# Patient Record
Sex: MALE | Race: WHITE | NOT HISPANIC OR LATINO
[De-identification: names, ages, dates, MRNs, and addresses within clinical notes are randomized per-mention and may not be internally consistent; named-entity substitution may affect disease eponyms.]

---

## 2018-06-13 ENCOUNTER — APPOINTMENT (OUTPATIENT)
Dept: UROLOGY | Facility: CLINIC | Age: 58
End: 2018-06-13
Payer: COMMERCIAL

## 2018-06-13 VITALS
SYSTOLIC BLOOD PRESSURE: 159 MMHG | HEIGHT: 74 IN | BODY MASS INDEX: 33.37 KG/M2 | WEIGHT: 260 LBS | HEART RATE: 90 BPM | DIASTOLIC BLOOD PRESSURE: 86 MMHG

## 2018-06-13 DIAGNOSIS — Z78.9 OTHER SPECIFIED HEALTH STATUS: ICD-10-CM

## 2018-06-13 DIAGNOSIS — E11.9 TYPE 2 DIABETES MELLITUS W/OUT COMPLICATIONS: ICD-10-CM

## 2018-06-13 DIAGNOSIS — B36.9 SUPERFICIAL MYCOSIS, UNSPECIFIED: ICD-10-CM

## 2018-06-13 DIAGNOSIS — Z80.1 FAMILY HISTORY OF MALIGNANT NEOPLASM OF TRACHEA, BRONCHUS AND LUNG: ICD-10-CM

## 2018-06-13 DIAGNOSIS — M10.9 GOUT, UNSPECIFIED: ICD-10-CM

## 2018-06-13 PROCEDURE — 99204 OFFICE O/P NEW MOD 45 MIN: CPT

## 2018-06-13 RX ORDER — FEBUXOSTAT 40 MG/1
40 TABLET ORAL
Qty: 60 | Refills: 0 | Status: ACTIVE | COMMUNITY
Start: 2018-05-01

## 2018-06-13 RX ORDER — RAMIPRIL 5 MG/1
5 CAPSULE ORAL
Qty: 30 | Refills: 0 | Status: ACTIVE | COMMUNITY
Start: 2017-09-22

## 2018-06-13 RX ORDER — PIOGLITAZONE HYDROCHLORIDE 30 MG/1
30 TABLET ORAL
Qty: 60 | Refills: 0 | Status: ACTIVE | COMMUNITY
Start: 2018-05-21

## 2018-06-13 RX ORDER — NAPROXEN 500 MG/1
500 TABLET ORAL
Qty: 60 | Refills: 0 | Status: ACTIVE | COMMUNITY
Start: 2018-03-06

## 2018-06-13 RX ORDER — SIMVASTATIN 20 MG/1
20 TABLET, FILM COATED ORAL
Qty: 30 | Refills: 0 | Status: ACTIVE | COMMUNITY
Start: 2017-10-23

## 2018-07-10 ENCOUNTER — APPOINTMENT (OUTPATIENT)
Dept: UROLOGY | Facility: CLINIC | Age: 58
End: 2018-07-10
Payer: COMMERCIAL

## 2018-07-10 PROCEDURE — 93980 PENILE VASCULAR STUDY: CPT

## 2018-08-21 ENCOUNTER — APPOINTMENT (OUTPATIENT)
Dept: UROLOGY | Facility: CLINIC | Age: 58
End: 2018-08-21
Payer: COMMERCIAL

## 2018-08-21 VITALS
BODY MASS INDEX: 33.37 KG/M2 | SYSTOLIC BLOOD PRESSURE: 136 MMHG | HEIGHT: 74 IN | DIASTOLIC BLOOD PRESSURE: 73 MMHG | HEART RATE: 90 BPM | WEIGHT: 260 LBS

## 2018-08-21 PROCEDURE — 99213 OFFICE O/P EST LOW 20 MIN: CPT

## 2018-08-21 RX ORDER — VARDENAFIL HYDROCHLORIDE 11.85 MG/1
10 TABLET, ORALLY DISINTEGRATING ORAL
Qty: 4 | Refills: 5 | Status: ACTIVE | COMMUNITY
Start: 2018-08-21 | End: 1900-01-01

## 2018-11-06 ENCOUNTER — APPOINTMENT (OUTPATIENT)
Dept: UROLOGY | Facility: CLINIC | Age: 58
End: 2018-11-06
Payer: COMMERCIAL

## 2018-11-06 VITALS
DIASTOLIC BLOOD PRESSURE: 67 MMHG | SYSTOLIC BLOOD PRESSURE: 112 MMHG | BODY MASS INDEX: 33.37 KG/M2 | HEART RATE: 84 BPM | HEIGHT: 74 IN | WEIGHT: 260 LBS

## 2018-11-06 DIAGNOSIS — N52.9 MALE ERECTILE DYSFUNCTION, UNSPECIFIED: ICD-10-CM

## 2018-11-06 PROCEDURE — 99213 OFFICE O/P EST LOW 20 MIN: CPT

## 2018-11-06 RX ORDER — VARDENAFIL 20 MG/1
20 TABLET, FILM COATED ORAL DAILY
Qty: 6 | Refills: 5 | Status: ACTIVE | COMMUNITY
Start: 2018-11-06 | End: 1900-01-01

## 2019-09-21 ENCOUNTER — EMERGENCY (EMERGENCY)
Facility: HOSPITAL | Age: 59
LOS: 0 days | Discharge: HOME | End: 2019-09-21
Admitting: EMERGENCY MEDICINE
Payer: COMMERCIAL

## 2019-09-21 VITALS
OXYGEN SATURATION: 95 % | HEART RATE: 93 BPM | RESPIRATION RATE: 18 BRPM | DIASTOLIC BLOOD PRESSURE: 90 MMHG | TEMPERATURE: 97 F | SYSTOLIC BLOOD PRESSURE: 163 MMHG

## 2019-09-21 VITALS — WEIGHT: 250 LBS | HEIGHT: 75 IN

## 2019-09-21 DIAGNOSIS — K08.89 OTHER SPECIFIED DISORDERS OF TEETH AND SUPPORTING STRUCTURES: ICD-10-CM

## 2019-09-21 DIAGNOSIS — E11.9 TYPE 2 DIABETES MELLITUS WITHOUT COMPLICATIONS: ICD-10-CM

## 2019-09-21 DIAGNOSIS — Z79.899 OTHER LONG TERM (CURRENT) DRUG THERAPY: ICD-10-CM

## 2019-09-21 DIAGNOSIS — Z79.1 LONG TERM (CURRENT) USE OF NON-STEROIDAL ANTI-INFLAMMATORIES (NSAID): ICD-10-CM

## 2019-09-21 DIAGNOSIS — K02.9 DENTAL CARIES, UNSPECIFIED: ICD-10-CM

## 2019-09-21 PROCEDURE — 99283 EMERGENCY DEPT VISIT LOW MDM: CPT

## 2019-09-21 RX ORDER — AMOXICILLIN 250 MG/5ML
1 SUSPENSION, RECONSTITUTED, ORAL (ML) ORAL
Qty: 21 | Refills: 0
Start: 2019-09-21 | End: 2019-09-28

## 2019-09-21 RX ORDER — AMOXICILLIN 250 MG/5ML
1 SUSPENSION, RECONSTITUTED, ORAL (ML) ORAL
Qty: 21 | Refills: 0
Start: 2019-09-21 | End: 2019-09-27

## 2019-09-21 RX ORDER — DAPAGLIFLOZIN AND METFORMIN HYDROCHLORIDE 10; 1000 MG/1; MG/1
1 TABLET, FILM COATED, EXTENDED RELEASE ORAL
Qty: 0 | Refills: 0 | DISCHARGE

## 2019-09-21 RX ORDER — PIOGLITAZONE HYDROCHLORIDE 15 MG/1
0 TABLET ORAL
Qty: 0 | Refills: 0 | DISCHARGE

## 2019-09-21 RX ORDER — IBUPROFEN 200 MG
1 TABLET ORAL
Qty: 21 | Refills: 0
Start: 2019-09-21 | End: 2019-09-27

## 2019-09-21 RX ORDER — IBUPROFEN 200 MG
1 TABLET ORAL
Qty: 21 | Refills: 0
Start: 2019-09-21 | End: 2019-09-28

## 2019-09-21 RX ORDER — FEBUXOSTAT 40 MG/1
1 TABLET ORAL
Qty: 0 | Refills: 0 | DISCHARGE

## 2019-09-21 NOTE — ED PROVIDER NOTE - PHYSICAL EXAMINATION
Physical Exam    Vital Signs: I have reviewed the initial vital signs.  Constitutional: well-nourished, appears stated age, no acute distress  Mouth: + pain and cavity noted to side of tooth #17  Skin: warm, dry  Neuro: AOx3, No focal deficits noted

## 2019-09-21 NOTE — ED PROVIDER NOTE - OBJECTIVE STATEMENT
60 yo M hx of DM, gout, HTN c/o left lower dental pain x 3 days. Pain is constant, severe, with no modifying factors. No fevers.

## 2019-09-21 NOTE — ED PROVIDER NOTE - NSFOLLOWUPINSTRUCTIONS_ED_ALL_ED_FT
Follow up with dental clinic Monday morning.     Dental Pain    Dental pain (toothache) may be caused by many things including tooth decay (cavities or caries), abscess or infection, injury, or the reason may be unknown. Your pain may only occur when you are chewing, are exposed to hot or cold temperature, are eating or drinking sugary foods or beverages, or your pain may be constant. If you were prescribed an antibiotic medicine, finish all of it even if you start to feel better. Rinsing your mouth with salt water or applying ice to the painful area of your face may help with the pain.    SEEK IMMEDIATE MEDICAL CARE IF YOU HAVE THE FOLLOWING SYMPTOMS: unable to open mouth, trouble breathing or swallowing, fever, or swelling of the face, neck or jaw.

## 2019-09-21 NOTE — ED ADULT TRIAGE NOTE - CHIEF COMPLAINT QUOTE
pt co pain to left gum pain. cleaning was done a few weeks go, denies SOB, nausea, vomiting, diarrhea, chest pain, diaphoresis, headache, dizziness, loc, cough, fever, trauma numbness, tingling.

## 2019-09-21 NOTE — ED PROVIDER NOTE - NSFOLLOWUPCLINICS_GEN_ALL_ED_FT
[facility]  Dental  [address] [place] [id number]  Phone: [phone]  Fax:   Follow Up Time: 1-3 Days

## 2019-09-21 NOTE — ED PROVIDER NOTE - PATIENT PORTAL LINK FT
You can access the FollowMyHealth Patient Portal offered by [facility] by registering at the following website: [email or web address]. By joining [facility]’s FollowMyHealth portal, you will also be able to view your health information using other applications (apps) compatible with our system.

## 2019-09-21 NOTE — ED PROVIDER NOTE - NS ED ROS FT
Constitutional: (-) fever  Mouth: (+)dental pain  Skin: (-) rash  Muskuloskeletal: (-) swelling  Neurological: (-) altered mental status

## 2020-09-11 NOTE — ED ADULT NURSE NOTE - CHIEF COMPLAINT QUOTE
pt co pain to left gum pain. cleaning was done a few weeks go, denies SOB, nausea, vomiting, diarrhea, chest pain, diaphoresis, headache, dizziness, loc, cough, fever, trauma numbness, tingling. [Rash] : rash [Negative] : Skin

## 2022-04-01 PROBLEM — E11.9 TYPE 2 DIABETES MELLITUS WITHOUT COMPLICATIONS: Chronic | Status: ACTIVE | Noted: 2019-09-21

## 2022-04-01 PROBLEM — M10.9 GOUT, UNSPECIFIED: Chronic | Status: ACTIVE | Noted: 2019-09-21

## 2022-06-30 ENCOUNTER — APPOINTMENT (OUTPATIENT)
Dept: UROLOGY | Facility: CLINIC | Age: 62
End: 2022-06-30

## 2022-06-30 VITALS
BODY MASS INDEX: 33.62 KG/M2 | HEART RATE: 91 BPM | DIASTOLIC BLOOD PRESSURE: 77 MMHG | SYSTOLIC BLOOD PRESSURE: 127 MMHG | HEIGHT: 74 IN | WEIGHT: 262 LBS

## 2022-06-30 DIAGNOSIS — N13.8 BENIGN PROSTATIC HYPERPLASIA WITH LOWER URINARY TRACT SYMPMS: ICD-10-CM

## 2022-06-30 DIAGNOSIS — Z00.00 ENCOUNTER FOR GENERAL ADULT MEDICAL EXAMINATION W/OUT ABNORMAL FINDINGS: ICD-10-CM

## 2022-06-30 DIAGNOSIS — R39.9 UNSPECIFIED SYMPTOMS AND SIGNS INVOLVING THE GENITOURINARY SYSTEM: ICD-10-CM

## 2022-06-30 DIAGNOSIS — N40.1 BENIGN PROSTATIC HYPERPLASIA WITH LOWER URINARY TRACT SYMPMS: ICD-10-CM

## 2022-06-30 LAB
BILIRUB UR QL STRIP: NORMAL
COLLECTION METHOD: NORMAL
GLUCOSE UR-MCNC: 1000
HCG UR QL: 0.2 EU/DL
HGB UR QL STRIP.AUTO: NORMAL
KETONES UR-MCNC: NORMAL
LEUKOCYTE ESTERASE UR QL STRIP: NORMAL
NITRITE UR QL STRIP: NORMAL
PH UR STRIP: 6
PROT UR STRIP-MCNC: NORMAL
SP GR UR STRIP: 1.01

## 2022-06-30 PROCEDURE — 81003 URINALYSIS AUTO W/O SCOPE: CPT | Mod: QW

## 2022-06-30 PROCEDURE — 99203 OFFICE O/P NEW LOW 30 MIN: CPT

## 2022-11-02 ENCOUNTER — APPOINTMENT (OUTPATIENT)
Dept: NEUROLOGY | Facility: CLINIC | Age: 62
End: 2022-11-02

## 2023-01-10 ENCOUNTER — APPOINTMENT (OUTPATIENT)
Dept: NEUROLOGY | Facility: CLINIC | Age: 63
End: 2023-01-10
Payer: COMMERCIAL

## 2023-01-10 ENCOUNTER — NON-APPOINTMENT (OUTPATIENT)
Age: 63
End: 2023-01-10

## 2023-01-10 VITALS
HEART RATE: 91 BPM | DIASTOLIC BLOOD PRESSURE: 78 MMHG | HEIGHT: 74 IN | SYSTOLIC BLOOD PRESSURE: 132 MMHG | WEIGHT: 239 LBS | BODY MASS INDEX: 30.67 KG/M2

## 2023-01-10 DIAGNOSIS — Z78.9 OTHER SPECIFIED HEALTH STATUS: ICD-10-CM

## 2023-01-10 DIAGNOSIS — F10.20 ALCOHOL DEPENDENCE, UNCOMPLICATED: ICD-10-CM

## 2023-01-10 DIAGNOSIS — F03.90 UNSPECIFIED DEMENTIA W/OUT BEHAVIORAL DISTURBANCE: ICD-10-CM

## 2023-01-10 PROCEDURE — 99204 OFFICE O/P NEW MOD 45 MIN: CPT

## 2023-01-10 RX ORDER — CLOTRIMAZOLE AND BETAMETHASONE DIPROPIONATE 10; .5 MG/G; MG/G
1-0.05 CREAM TOPICAL TWICE DAILY
Qty: 14 | Refills: 2 | Status: DISCONTINUED | COMMUNITY
Start: 2018-06-13 | End: 2023-01-10

## 2023-01-10 RX ORDER — FOLIC ACID 1 MG/1
1 TABLET ORAL
Qty: 30 | Refills: 5 | Status: ACTIVE | COMMUNITY
Start: 2023-01-10 | End: 1900-01-01

## 2023-01-10 NOTE — ASSESSMENT
[FreeTextEntry1] : 62 year old man with medical history of diabetes, BPH, Gout and chronic daily alcohol intake  is here as a new patient for memory and cognition decline for past 2-3 years more noticeable after his wife passed away. His MOCA score is 24/30 as he missed scores on attention and delayed recalls. He still drinks about 6 cans of beer every day for past 40 years. He has hard time remembering his tasks and needs redirections. 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 On exam also has evidence of neuropathy likely due to chronic diabetes and alcoholism. \par \par - Brain MRI w/o \par - Check thiamin, folate, B12, TSH and A1C\par - Ref to psychiatrist\par - Start thiamin and folate\par - RTC in 3 months

## 2023-01-10 NOTE — HISTORY OF PRESENT ILLNESS
[FreeTextEntry1] : CRYSTAL MANCUSO is a 62 year old man with medical history of diabetes, BPH, Gout and chronic daily alcohol intake  is here as a new patient for memory changes. He is accompanied with his daughters to the office. 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 His wide passed away on March 2022 and his daughter moved in with her family to live with him since then. He is still drinking about 6 packs of beer every day for almost 40 years. The memory and cognition issues were noticed before his wise illness about 3-4 years ago and became more evident after his wife illness started and is not being noticed by his daughter after his wife passed. He now has hard time remembering his tasks, names and numbers. He has to be directed many time in order to finish a simple tasks. He sometimes gets easily angry and becomes verbally abusive to his family. When he is drunk he sometimes looses control of his urinate and bowel. He stays home for most of the time of the day and watches TV. His lora is going to bar. He still drives and no report of misshappening at home. He now needs more help for personal hygiene. He sleeps fine and denies depressed mood but his daughter heard him crying in his room few times.

## 2023-01-10 NOTE — REVIEW OF SYSTEMS
[Change In Personality] : personality change [Memory Lapses or Loss] : memory loss [Decr. Concentrating Ability] : decreased concentrating ability [Difficulty with Language] : ~M difficulty with language [Changed Thought Patterns] : changed thought patterns [Repeating Questions] : repeated questioning about recent events [Difficulty Walking] : difficulty walking [Negative] : Heme/Lymph

## 2023-01-10 NOTE — PHYSICAL EXAM
[FreeTextEntry1] : Mental status: Awake, alert and oriented x3. MOCA score; 24/30. He missed scores on attention and delayed recalls. \par Cranial nerves: Pupils equally round and reactive to light, visual fields full, no nystagmus, extraocular muscles intact, V1 through V3 intact bilaterally and symmetric, face symmetric, hearing intact to finger rub, palate elevation symmetric, tongue was midline.\par Motor:  MRC grading 5/5 b/l UE/LE.   strength 5/5.  Normal tone and bulk.  No abnormal movements.  \par Sensation: Reduced vibration in the toes. \par Coordination: No dysmetria on finger-to-nose and heel-to-shin.  No dysdiadokinesia.\par Reflexes: 3+ in bilateral UE/LE, downgoing toes bilaterally. (-) Brown.\par Gait: wide based. Positive Romberg. \par

## 2023-11-24 ENCOUNTER — APPOINTMENT (OUTPATIENT)
Dept: NEUROLOGY | Facility: CLINIC | Age: 63
End: 2023-11-24